# Patient Record
Sex: MALE | Race: BLACK OR AFRICAN AMERICAN | NOT HISPANIC OR LATINO | Employment: UNEMPLOYED | URBAN - METROPOLITAN AREA
[De-identification: names, ages, dates, MRNs, and addresses within clinical notes are randomized per-mention and may not be internally consistent; named-entity substitution may affect disease eponyms.]

---

## 2023-05-18 LAB
ANION GAP IN SER/PLAS: 12 MMOL/L (ref 10–20)
BASOPHILS (10*3/UL) IN BLOOD BY AUTOMATED COUNT: 0.07 X10E9/L (ref 0–0.1)
BASOPHILS/100 LEUKOCYTES IN BLOOD BY AUTOMATED COUNT: 1.2 % (ref 0–2)
CALCIUM (MG/DL) IN SER/PLAS: 9.5 MG/DL (ref 8.6–10.3)
CARBON DIOXIDE, TOTAL (MMOL/L) IN SER/PLAS: 25 MMOL/L (ref 21–32)
CHLORIDE (MMOL/L) IN SER/PLAS: 100 MMOL/L (ref 98–107)
CREATININE (MG/DL) IN SER/PLAS: 0.84 MG/DL (ref 0.5–1.3)
EOSINOPHILS (10*3/UL) IN BLOOD BY AUTOMATED COUNT: 0.17 X10E9/L (ref 0–0.7)
EOSINOPHILS/100 LEUKOCYTES IN BLOOD BY AUTOMATED COUNT: 3 % (ref 0–6)
ERYTHROCYTE DISTRIBUTION WIDTH (RATIO) BY AUTOMATED COUNT: 13.6 % (ref 11.5–14.5)
ERYTHROCYTE MEAN CORPUSCULAR HEMOGLOBIN CONCENTRATION (G/DL) BY AUTOMATED: 35.7 G/DL (ref 32–36)
ERYTHROCYTE MEAN CORPUSCULAR VOLUME (FL) BY AUTOMATED COUNT: 90 FL (ref 80–100)
ERYTHROCYTES (10*6/UL) IN BLOOD BY AUTOMATED COUNT: 5.01 X10E12/L (ref 4.5–5.9)
GFR MALE: >90 ML/MIN/1.73M2
GLUCOSE (MG/DL) IN SER/PLAS: 100 MG/DL (ref 74–99)
HEMATOCRIT (%) IN BLOOD BY AUTOMATED COUNT: 45.1 % (ref 41–52)
HEMOGLOBIN (G/DL) IN BLOOD: 16.1 G/DL (ref 13.5–17.5)
IMMATURE GRANULOCYTES/100 LEUKOCYTES IN BLOOD BY AUTOMATED COUNT: 0.2 % (ref 0–0.9)
LEUKOCYTES (10*3/UL) IN BLOOD BY AUTOMATED COUNT: 5.7 X10E9/L (ref 4.4–11.3)
LYMPHOCYTES (10*3/UL) IN BLOOD BY AUTOMATED COUNT: 1.45 X10E9/L (ref 1.2–4.8)
LYMPHOCYTES/100 LEUKOCYTES IN BLOOD BY AUTOMATED COUNT: 25.4 % (ref 13–44)
MONOCYTES (10*3/UL) IN BLOOD BY AUTOMATED COUNT: 0.54 X10E9/L (ref 0.1–1)
MONOCYTES/100 LEUKOCYTES IN BLOOD BY AUTOMATED COUNT: 9.5 % (ref 2–10)
NEUTROPHILS (10*3/UL) IN BLOOD BY AUTOMATED COUNT: 3.46 X10E9/L (ref 1.2–7.7)
NEUTROPHILS/100 LEUKOCYTES IN BLOOD BY AUTOMATED COUNT: 60.7 % (ref 40–80)
PLATELETS (10*3/UL) IN BLOOD AUTOMATED COUNT: 225 X10E9/L (ref 150–450)
POTASSIUM (MMOL/L) IN SER/PLAS: 4.3 MMOL/L (ref 3.5–5.3)
SODIUM (MMOL/L) IN SER/PLAS: 133 MMOL/L (ref 136–145)
UREA NITROGEN (MG/DL) IN SER/PLAS: 11 MG/DL (ref 6–23)

## 2023-05-20 LAB — STAPH/MRSA SCREEN, CULTURE: NORMAL

## 2023-06-01 ENCOUNTER — HOSPITAL ENCOUNTER (OUTPATIENT)
Dept: DATA CONVERSION | Facility: HOSPITAL | Age: 63
End: 2023-06-01
Attending: SURGERY | Admitting: SURGERY

## 2023-06-01 DIAGNOSIS — I10 ESSENTIAL (PRIMARY) HYPERTENSION: ICD-10-CM

## 2023-06-01 DIAGNOSIS — G47.33 OBSTRUCTIVE SLEEP APNEA (ADULT) (PEDIATRIC): ICD-10-CM

## 2023-06-01 DIAGNOSIS — F17.200 NICOTINE DEPENDENCE, UNSPECIFIED, UNCOMPLICATED: ICD-10-CM

## 2023-06-01 DIAGNOSIS — K40.90 UNILATERAL INGUINAL HERNIA, WITHOUT OBSTRUCTION OR GANGRENE, NOT SPECIFIED AS RECURRENT: ICD-10-CM

## 2023-09-07 VITALS — BODY MASS INDEX: 27.11 KG/M2 | HEIGHT: 70 IN | WEIGHT: 189.38 LBS

## 2023-09-30 NOTE — H&P
History & Physical Reviewed:   I have reviewed the History and Physical dated:  18-May-2023   History and Physical reviewed and relevant findings noted. Patient examined to review pertinent physical  findings.: No significant changes   Home Medications Reviewed: no changes noted   Allergies Reviewed: no changes noted     Consent:   COVID-19 Consent:  ·  COVID-19 Risk Consent Surgeon has reviewed key risks related to the risk of brady COVID-19 and if they contract COVID-19 what the risks are.       Electronic Signatures:  Bang Arnold)  (Signed 01-Jun-2023 10:00)   Authored: History & Physical Reviewed, Consent, Note  Completion      Last Updated: 01-Jun-2023 10:00 by Bagn Arnold)

## 2023-10-02 NOTE — OP NOTE
PROCEDURE DETAILS    Preoperative Diagnosis:  Right inguinal hernia     Postoperative Diagnosis:  Right inguinal hernia     Surgeon: Bang Arnold  Resident/Fellow/Other Assistant: Jigar Treviño    Procedure:  1. Robotic Right Inguinal Hernia Repair; Mesh Placement    Anesthesia: General  Jr Rayo  Estimated Blood Loss: 5  Findings: moderate to large indirect right inguinal hernia     Specimens(s) Collected: no,     Complications: none  Patient Returned To/Condition: Stable to PACU         Operative Report:   CLINICAL NOTE:    The patient is a 63-year-old man comes in for elective repair of a symptomatic right inguinal hernia.   Risk benefits alternatives were discussed preoperatively and he agrees to the operation.      DESCRIPTION OF PROCEDURE:    The patient was taken to the operating room, placed supine on the operating table.  Time-out was established.  General anesthesia was induced.  He was given antibiotics.  A Rader catheter was placed that was later removed after the procedure.  The abdomen  was prepped and draped in a sterile fashion.  We made a supraumbilical midline incision, placed a 8 mm Shanique trocar.  We established insufflation. We placed 8 mm ports in the left and right upper abdominal wall respectively.  The patient had been placed  in the Trendelenburg position.  We then docked the robot to our ports.   I scrubbed out and performed the operation from a console in the corner of the room.      He was noted to have a moderate to large indirect right inguinal hernia.   We scored the peritoneum just below the right inguinal ligament and very carefully took the peritoneum down from the right lower abdominal wall.  With very careful dissection we  reduced the hernia sac and isolated the cord structures.  There was no obvious indirect component.  The entire myopectineal orifice was exposed.  We then introduced a  large right-sided 3DMax mesh through our camera port, positioned this  over the hernia  defect, and secured it into place using interrupted  3-0 Vicryl stitches.  Sutures were placed anteriorly, laterally and at coopers ligament.  Next we re-peritonealized our repair by sewing the peritoneum up using 3-0 absorbable V-Loc suture.     We then removed our ports and then closed our midline fascial defect using 0 Vicryl suture placed in a figure of eight fashion.  Skin was reapproximated using 3-0 and 4-0 subcuticular Vicryl stitches followed by Dermabond glue.  The patient tolerated  the procedure without difficulty and was returned to the recovery room in stable condition.    Bang Arnold MD   Note Recipients:   Bang Arnold MD Thomas, Paul, DO - [phone] []                        Attestation:   Note Completion:  Attending Attestation I performed the procedure without a resident         Electronic Signatures:  Bang Arnold)  (Signed 01-Jun-2023 12:27)   Authored: Post-Operative Note, Chart Review, Note Completion      Last Updated: 01-Jun-2023 12:27 by Bang Arnold)

## 2024-04-15 ENCOUNTER — OFFICE VISIT (OUTPATIENT)
Dept: SURGERY | Facility: CLINIC | Age: 64
End: 2024-04-15
Payer: COMMERCIAL

## 2024-04-15 VITALS
SYSTOLIC BLOOD PRESSURE: 113 MMHG | TEMPERATURE: 96 F | HEART RATE: 80 BPM | OXYGEN SATURATION: 98 % | DIASTOLIC BLOOD PRESSURE: 72 MMHG

## 2024-04-15 DIAGNOSIS — R10.30 LOWER ABDOMINAL PAIN: Primary | ICD-10-CM

## 2024-04-15 PROCEDURE — 3008F BODY MASS INDEX DOCD: CPT | Performed by: SURGERY

## 2024-04-15 PROCEDURE — 99213 OFFICE O/P EST LOW 20 MIN: CPT | Performed by: SURGERY

## 2024-04-15 ASSESSMENT — ENCOUNTER SYMPTOMS: DEPRESSION: 0

## 2024-04-15 ASSESSMENT — PAIN SCALES - GENERAL: PAINLEVEL: 10-WORST PAIN EVER

## 2024-04-15 NOTE — LETTER
"April 15, 2024     Graham Sheikh DO  [id number]  [address]  [place] [address]    Patient: Tom Morales   YOB: 1960   Date of Visit: 4/15/2024       Dear Dr. Graham Sheikh DO:    Thank you for referring Tom Morales to me for evaluation. Below are my notes for this consultation.  If you have questions, please do not hesitate to call me. I look forward to following your patient along with you.       Sincerely,     Bang Arnold MD      CC: No Recipients  ______________________________________________________________________________________    History of Present IllnessPatient presents for initial post-op follow-up from robotic repair of a large right inguinal hernia performed on 6/01/23.      At initial postop visit was noted to have a moderate-sized seroma. Also complained of pain in the groin with radiation to right testicle.. Also had exquisite hypersensitivity to right scrotum.     Comes back for follow-up.  The above symptoms has resolved.    He comes in with a new complaint of some crampy periumbilical and bilateral lower abdominal pain.  This occurs fairly suddenly after exertion especially worse with sex.  Pain is compared to that of a \"charley horse \".  I mention being dehydrated and he reports that in the recent past she has been under severe stress and admits to drinking at least a sixpack per day but he stopped this roughly 10 days ago.  He feels that he may have been dehydrated which could possibly contribute to his symptoms.    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)   · Non-restorative sleep (780.59) (G47.8)   · Pain of " left hand (729.5) (M79.642)   · Post-traumatic osteoarthritis of right knee (715.26) (M17.31)   · Snoring (786.09) (R06.83)   · Suspected sleep apnea (781.99) (R29.818)   · Wound, open, nose (873.20) (S01.20XA)   · Xerostomia (527.7) (K11.7)     Past Medical History     · History of hypertension (V12.59) (Z86.79)     Surgical History     · History of Hernia repair   · History of Knee surgery   · History of Neck Surgery   · History of Nose Surgery   · History of Tonsillectomy with adenoidectom      EXAM:   Well-nourished, well-developed. In no distress  Alert and oriented x 3  Lungs are clear to auscultation bilaterally.  Cardiac exam is regular rhythm and rate.  Abdomen is soft nontender nondistended.  Well-healed periumbilical incision.  No obvious hernias noted  Neurologic exam is without focal deficit.   No recurrent inguinal hernias,    Impression:  Crampy abdominal pain lower abdomen.  No obvious recurrence of his inguinal hernia    Will send for CT scan to further evaluate his stone abdominal wall symptoms are suggestive of however of a musculoskeletal cause

## 2024-04-15 NOTE — PATIENT INSTRUCTIONS
For the pain and there lower abdomen I recommend a CT scan.    Please call the number listed to schedule the study.    After you have gotten the scan give my office a call so that we can review the results

## 2024-04-15 NOTE — PROGRESS NOTES
"History of Present IllnessPatient presents for initial post-op follow-up from robotic repair of a large right inguinal hernia performed on 6/01/23.      At initial postop visit was noted to have a moderate-sized seroma. Also complained of pain in the groin with radiation to right testicle.. Also had exquisite hypersensitivity to right scrotum.     Comes back for follow-up.  The above symptoms has resolved.    He comes in with a new complaint of some crampy periumbilical and bilateral lower abdominal pain.  This occurs fairly suddenly after exertion especially worse with sex.  Pain is compared to that of a \"charley horse \".  I mention being dehydrated and he reports that in the recent past she has been under severe stress and admits to drinking at least a sixpack per day but he stopped this roughly 10 days ago.  He feels that he may have been dehydrated which could possibly contribute to his symptoms.    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 In no distress  Alert and " oriented x 3  Lungs are clear to auscultation bilaterally.  Cardiac exam is regular rhythm and rate.  Abdomen is soft nontender nondistended.  Well-healed periumbilical incision.  No obvious hernias noted  Neurologic exam is without focal deficit.   No recurrent inguinal hernias,    Impression:  Crampy abdominal pain lower abdomen.  No obvious recurrence of his inguinal hernia    Will send for CT scan to further evaluate his stone abdominal wall symptoms are suggestive of however of a musculoskeletal cause

## 2024-05-01 ENCOUNTER — HOSPITAL ENCOUNTER (OUTPATIENT)
Dept: RADIOLOGY | Facility: HOSPITAL | Age: 64
Discharge: HOME | End: 2024-05-01
Payer: MEDICARE

## 2024-05-01 DIAGNOSIS — R10.30 LOWER ABDOMINAL PAIN: ICD-10-CM

## 2024-05-01 PROCEDURE — 74176 CT ABD & PELVIS W/O CONTRAST: CPT

## 2024-05-01 PROCEDURE — 74176 CT ABD & PELVIS W/O CONTRAST: CPT | Performed by: RADIOLOGY

## 2024-05-09 ENCOUNTER — PREP FOR PROCEDURE (OUTPATIENT)
Dept: SURGERY | Facility: HOSPITAL | Age: 64
End: 2024-05-09
Payer: COMMERCIAL

## 2024-05-09 ENCOUNTER — TELEPHONE (OUTPATIENT)
Dept: SURGERY | Facility: CLINIC | Age: 64
End: 2024-05-09
Payer: COMMERCIAL

## 2024-05-09 DIAGNOSIS — K42.9 UMBILICAL HERNIA WITHOUT OBSTRUCTION AND WITHOUT GANGRENE: Primary | ICD-10-CM

## 2024-05-09 NOTE — TELEPHONE ENCOUNTER
Called patient to confirm surgery date. Informed patient that his surgery date is June 25, 2024 with Dr. Bang Arnold at [facility]. Patient agreed to date. Instructions give verbal and instruction sheet will be mailed out today. Patient understood.

## 2024-06-04 ENCOUNTER — CLINICAL SUPPORT (OUTPATIENT)
Dept: PREADMISSION TESTING | Facility: HOSPITAL | Age: 64
End: 2024-06-04
Payer: MEDICARE

## 2024-06-04 DIAGNOSIS — K42.9 UMBILICAL HERNIA WITHOUT OBSTRUCTION AND WITHOUT GANGRENE: ICD-10-CM

## 2024-06-04 RX ORDER — VITAMIN B COMPLEX
1 CAPSULE ORAL DAILY
COMMUNITY

## 2024-06-04 RX ORDER — ACETAMINOPHEN 500 MG
5000 TABLET ORAL DAILY
COMMUNITY

## 2024-06-04 RX ORDER — GINKGO BILOBA LEAF EXTRACT 60 MG
2 CAPSULE ORAL DAILY
COMMUNITY

## 2024-06-04 RX ORDER — GREEN TEA LEAF EXTRACT 500 MG
1 CAPSULE ORAL DAILY
COMMUNITY

## 2024-06-04 RX ORDER — LOSARTAN POTASSIUM AND HYDROCHLOROTHIAZIDE 12.5; 5 MG/1; MG/1
1 TABLET ORAL DAILY
COMMUNITY

## 2024-06-11 ENCOUNTER — PRE-ADMISSION TESTING (OUTPATIENT)
Dept: PREADMISSION TESTING | Facility: HOSPITAL | Age: 64
End: 2024-06-11
Payer: MEDICARE

## 2024-06-11 ENCOUNTER — LAB (OUTPATIENT)
Dept: LAB | Facility: LAB | Age: 64
End: 2024-06-11
Payer: MEDICARE

## 2024-06-11 VITALS
DIASTOLIC BLOOD PRESSURE: 80 MMHG | WEIGHT: 180.78 LBS | OXYGEN SATURATION: 98 % | TEMPERATURE: 98 F | HEART RATE: 70 BPM | HEIGHT: 70 IN | BODY MASS INDEX: 25.88 KG/M2 | RESPIRATION RATE: 16 BRPM | SYSTOLIC BLOOD PRESSURE: 145 MMHG

## 2024-06-11 DIAGNOSIS — Z01.818 PRE-OP TESTING: ICD-10-CM

## 2024-06-11 DIAGNOSIS — Z01.818 PRE-OP TESTING: Primary | ICD-10-CM

## 2024-06-11 LAB
ALBUMIN SERPL BCP-MCNC: 4.9 G/DL (ref 3.4–5)
ALP SERPL-CCNC: 60 U/L (ref 33–136)
ALT SERPL W P-5'-P-CCNC: 14 U/L (ref 10–52)
ANION GAP SERPL CALC-SCNC: 13 MMOL/L (ref 10–20)
AST SERPL W P-5'-P-CCNC: 21 U/L (ref 9–39)
BASOPHILS # BLD AUTO: 0.08 X10*3/UL (ref 0–0.1)
BASOPHILS NFR BLD AUTO: 1.4 %
BILIRUB SERPL-MCNC: 0.6 MG/DL (ref 0–1.2)
BUN SERPL-MCNC: 6 MG/DL (ref 6–23)
CALCIUM SERPL-MCNC: 10 MG/DL (ref 8.6–10.3)
CHLORIDE SERPL-SCNC: 106 MMOL/L (ref 98–107)
CO2 SERPL-SCNC: 24 MMOL/L (ref 21–32)
CREAT SERPL-MCNC: 0.84 MG/DL (ref 0.5–1.3)
EGFRCR SERPLBLD CKD-EPI 2021: >90 ML/MIN/1.73M*2
EOSINOPHIL # BLD AUTO: 0.19 X10*3/UL (ref 0–0.7)
EOSINOPHIL NFR BLD AUTO: 3.3 %
ERYTHROCYTE [DISTWIDTH] IN BLOOD BY AUTOMATED COUNT: 16 % (ref 11.5–14.5)
GLUCOSE SERPL-MCNC: 86 MG/DL (ref 74–99)
HCT VFR BLD AUTO: 48.6 % (ref 41–52)
HGB BLD-MCNC: 17 G/DL (ref 13.5–17.5)
IMM GRANULOCYTES # BLD AUTO: 0.01 X10*3/UL (ref 0–0.7)
IMM GRANULOCYTES NFR BLD AUTO: 0.2 % (ref 0–0.9)
LYMPHOCYTES # BLD AUTO: 1.9 X10*3/UL (ref 1.2–4.8)
LYMPHOCYTES NFR BLD AUTO: 33.4 %
MCH RBC QN AUTO: 32.8 PG (ref 26–34)
MCHC RBC AUTO-ENTMCNC: 35 G/DL (ref 32–36)
MCV RBC AUTO: 94 FL (ref 80–100)
MONOCYTES # BLD AUTO: 0.62 X10*3/UL (ref 0.1–1)
MONOCYTES NFR BLD AUTO: 10.9 %
NEUTROPHILS # BLD AUTO: 2.89 X10*3/UL (ref 1.2–7.7)
NEUTROPHILS NFR BLD AUTO: 50.8 %
NRBC BLD-RTO: 0 /100 WBCS (ref 0–0)
PLATELET # BLD AUTO: 238 X10*3/UL (ref 150–450)
POTASSIUM SERPL-SCNC: 3.9 MMOL/L (ref 3.5–5.3)
PROT SERPL-MCNC: 7.9 G/DL (ref 6.4–8.2)
RBC # BLD AUTO: 5.18 X10*6/UL (ref 4.5–5.9)
SODIUM SERPL-SCNC: 139 MMOL/L (ref 136–145)
WBC # BLD AUTO: 5.7 X10*3/UL (ref 4.4–11.3)

## 2024-06-11 PROCEDURE — 93005 ELECTROCARDIOGRAM TRACING: CPT | Performed by: NURSE PRACTITIONER

## 2024-06-11 PROCEDURE — 85025 COMPLETE CBC W/AUTO DIFF WBC: CPT

## 2024-06-11 PROCEDURE — 80053 COMPREHEN METABOLIC PANEL: CPT

## 2024-06-11 PROCEDURE — 36415 COLL VENOUS BLD VENIPUNCTURE: CPT

## 2024-06-11 PROCEDURE — 87081 CULTURE SCREEN ONLY: CPT | Mod: AHULAB | Performed by: SURGERY

## 2024-06-11 RX ORDER — TRETINOIN 0.25 MG/G
CREAM TOPICAL NIGHTLY
COMMUNITY

## 2024-06-11 RX ORDER — CALCIPOTRIENE 0.005% / CLOBETASOL PROPIONATE 0.05% .005; .05 G/100G; G/100G
SOLUTION TOPICAL
COMMUNITY

## 2024-06-11 RX ORDER — ASPIRIN 81 MG/1
81 TABLET ORAL DAILY
COMMUNITY

## 2024-06-11 RX ORDER — VITAMIN E (DL,TOCOPHERYL ACET) 180 MG
CAPSULE ORAL
COMMUNITY

## 2024-06-11 RX ORDER — CHLORHEXIDINE GLUCONATE ORAL RINSE 1.2 MG/ML
SOLUTION DENTAL
Qty: 475 ML | Refills: 0 | Status: SHIPPED | OUTPATIENT
Start: 2024-06-11

## 2024-06-11 ASSESSMENT — ENCOUNTER SYMPTOMS
DIARRHEA: 0
ABDOMINAL PAIN: 0
EYES NEGATIVE: 1
CONSTIPATION: 0
CARDIOVASCULAR NEGATIVE: 1
BRUISES/BLEEDS EASILY: 0
NEUROLOGICAL NEGATIVE: 1
CONSTITUTIONAL NEGATIVE: 1
ENDOCRINE NEGATIVE: 1
ARTHRALGIAS: 1

## 2024-06-11 NOTE — CPM/PAT H&P
"[facility]/PAT Evaluation       Name: Tom Morales (Tom Morales)  /Age: 1960/64 y.o.         Date of Consult: 24     Referring Provider: Dr. Arnold    Surgery, Date, and Length: Open Umbilical Hernia Repair; Mesh Placement, 24, 90 min.    Tom Morales is a 64 year-old male who presents to the [facility] for perioperative risk assessment prior to surgery.    Patient presents with a primary diagnosis of an umbilical hernia without obstruction and without gangrene.    He comes in with a new complaint of some crampy periumbilical and bilateral lower abdominal pain. This occurs fairly suddenly after exertion especially worse with sex. Pain is compared to that of a \"charley horse \".   He also notes that when he exerts himself, his hernia \"pops out\". Of note he drinks a 6 pack of beer daily.   This note was created in part upon personal review of patient's medical records.      Patient is scheduled to have an Open Umbilical Hernia Repair; Mesh Placement.    Pt denies any past history of anesthetic complications such as PONV, awareness, prolonged sedation, dental damage, aspiration, cardiac arrest, difficult intubation, difficult I.V. access or unexpected hospital admissions.  NO malignant hyperthermia and or pseudocholinesterase deficiency.  No history of blood transfusions     The patient is not a Islam and will accept blood and blood products if medically indicated.   Type and screen sent.     Past Medical History:   Diagnosis Date    Hypertension        Past Surgical History:   Procedure Laterality Date    ADENOIDECTOMY      as a child    HERNIA REPAIR          JOINT REPLACEMENT      Right knee    NECK SURGERY  2018    Neck Surgery    NOSE SURGERY  2018    Nose Surgery    TONSILECTOMY, ADENOIDECTOMY, BILATERAL MYRINGOTOMY AND TUBES      as a child     Social History     Socioeconomic History    Marital status:      Spouse name: Not on file    Number of children: " Not on file    Years of education: Not on file    Highest education level: Not on file   Occupational History    Not on file   Tobacco Use    Smoking status: Every Day     Types: Cigarettes, Cigars    Smokeless tobacco: Never    Tobacco comments:     Black and mild, smokes 4 daily   Vaping Use    Vaping status: Never Used   Substance and Sexual Activity    Alcohol use: Yes     Alcohol/week: 42.0 standard drinks of alcohol     Types: 42 Cans of beer per week    Drug use: Yes     Types: Marijuana     Comment: smokes daily    Sexual activity: Defer   Other Topics Concern    Not on file   Social History Narrative    Not on file     Social Determinants of Health     Financial Resource Strain: Not on file   Food Insecurity: Not on file   Transportation Needs: Not on file   Physical Activity: Not on file   Stress: Not on file   Social Connections: Not on file   Intimate Partner Violence: Not on file   Housing Stability: Not on file        Family History   Problem Relation Name Age of Onset    Other (htn) Mother      Sleep apnea Mother      No Known Problems Father      No Known Problems Sister      Other (htn) Brother         Allergies   Allergen Reactions    Ace Inhibitors Anaphylaxis    Amoxicillin Unknown     Patient thinks reaction happened at same time as Ace inhibitors         Current Outpatient Medications:     aspirin 81 mg EC tablet, Take 1 tablet (81 mg) by mouth once daily., Disp: , Rfl:     b complex vitamins capsule, Take 1 capsule by mouth once daily., Disp: , Rfl:     CAPSICUM, CAYENNE, ORAL, Take 1 capsule by mouth once daily., Disp: , Rfl:     cholecalciferol (Vitamin D3) 5,000 Units tablet, Take 1 tablet (5,000 Units) by mouth once daily., Disp: , Rfl:     clobetasoL-calcipotriene 0.05-0.005 % solution, Apply topically., Disp: , Rfl:     cranberry 400 mg capsule, Take 3 capsules by mouth once daily., Disp: , Rfl:     ginseng 100 mg capsule, Take 2 capsules by mouth once daily., Disp: , Rfl:     hyalur  ac/chond sul/colg II/AA (HYALURONIC ACID, CHOND-COLLGN, ORAL), Take 1 capsule by mouth once daily., Disp: , Rfl:     KRILL OIL ORAL, Take 1 capsule by mouth once daily., Disp: , Rfl:     losartan-hydrochlorothiazide (Hyzaar) 50-12.5 mg tablet, Take 1 tablet by mouth once daily., Disp: , Rfl:     psyllium (Metamucil) powder, Take 1 Dose (5.8 g) by mouth once daily., Disp: , Rfl:     saw/vit E/sod ruby/lyc/beta/pyg (PROSTATE HEALTH ORAL), Take 1 capsule by mouth once daily., Disp: , Rfl:     tretinoin (Retin-A) 0.025 % cream, Apply topically once daily at bedtime., Disp: , Rfl:     turmeric/turmeric ext/pepr ext (turmeric-turmeric ext-pepper) 500-3 mg capsule, Take by mouth., Disp: , Rfl:     chlorhexidine (Peridex) 0.12 % solution, Swish for 30 seconds and spit 15mL of solution the night before and morning of surgery, Disp: 475 mL, Rfl: 0    green tea leaf extract 500 mg capsule, Take 1 capsule by mouth once daily., Disp: , Rfl:     NON FORMULARY, , Disp: , Rfl:       PAT ROS:   Constitutional:   neg    Neuro/Psych:   neg    Eyes:   neg    Ears:    no hearing aides  Nose:   Mouth:   neg    Throat:   neg    Neck:    History of neck surgery, has limitations when turing his neck to the right side. Note he will get vertigo if he turns his neck too much when looking behind him when pulling his car out of the driveway.   Cardio:   neg    Respiratory:    Suspected sleep apnea, does not wear CPAP  Endocrine:   neg    GI:    no abdominal pain   no constipation   no diarrhea  :   neg    Musculoskeletal:    arthralgias (generalized joint pain)  Hematologic:    does not bruise/bleed easily   no history of blood transfusion   no blood clots  Skin:   Scabbed area noted on right nare.      Physical Exam  Physical exam within normal limits.   Neck:      Comments: History of neck surgery, has limitations when turing his neck to the right side. Note he will get vertigo if he turns his neck too much when looking behind him when  "pulling his car out of the driveway.   Abdominal:      General: Bowel sounds are normal. There is no distension.      Palpations: Abdomen is soft.      Tenderness: There is no abdominal tenderness. There is no guarding.      Hernia: A hernia is present.          PAT AIRWAY:   Airway:     Mallampati::  III    Neck ROM::  Limited   No broken teeth, no dentures and no missing teeth        Visit Vitals  /80   Pulse 70   Temp 36.7 °C (98 °F)   Resp 16   Ht 1.775 m (5' 9.88\")   Wt 82 kg (180 lb 12.4 oz)   SpO2 98%   BMI 26.03 kg/m²   Smoking Status Every Day   BSA 2.01 m²      Plan    Neuro:  Monitor for signs and symptoms of substance withdrawal during the postoperative period, assess, and treat as needed with the appropriate monitoring tool.     Cardiovascular:  Patient denies any chest pain, tightness, heaviness, pressure, radiating pain, palpitations, irregular heartbeats, lightheadedness, cough, congestion, shortness of breath, CAR, PND, near syncope, weight loss or gain.    Asa 81 mg -to be taken dos     HTN:   Losartan-hydrochlorothiazide- Patient to hold one day prior to surgery.    EKG in PAT on 06/11/24   NSR@71   Possible left atrial enlargement  Left anterior fasicular block  Septal infarct  Abnormal EKG    RCRI: 1 Risk of Mace: 6.0%    Pulm:  DENIZ-Patient asked to follow up with PCP for suspected DENIZ. Recommend prioritizing  nonopioid analgesic techniques (regional and local anesthesia, nonsteroidal medications, etc) before the administration of opioids and close monitoring for hypoventilation after surgery due to suspected DENIZ. If intravenous narcotics are needed beyond the immediate saumya-operative period, the patient may benefit from continuous pulse oximetry to monitor for hypoxic events till baseline Sp02 is normal on room air and  a respiratory therapy evaluation.     Heme:  Patient instructed to ambulate as soon as possible postoperatively to decrease thromboembolic risk.    Initiate mechanical DVT " prophylaxis as soon as possible and initiate chemical prophylaxis when deemed safe from a bleeding standpoint post surgery.    Caprini=4    Risk assessment complete.  Patient is scheduled for an intermediate surgical risk procedure.      Labs/testing obtained in PAT on 06/11/24   CBC, CMP, MRSA, EKG  Lab Results   Component Value Date    WBC 5.7 06/11/2024    HGB 17.0 06/11/2024    HCT 48.6 06/11/2024    MCV 94 06/11/2024     06/11/2024      Lab Results   Component Value Date    GLUCOSE 86 06/11/2024    CALCIUM 10.0 06/11/2024     06/11/2024    K 3.9 06/11/2024    CO2 24 06/11/2024     06/11/2024    BUN 6 06/11/2024    CREATININE 0.84 06/11/2024      Lab Results   Component Value Date    ALT 14 06/11/2024    AST 21 06/11/2024    ALKPHOS 60 06/11/2024    BILITOT 0.6 06/11/2024      CBC, CMP reviewed, unremarkable.     Follow up: MRSA pending    Preoperative medication instructions were provided and reviewed with the patient.  Any additional testing or evaluation was explained to the patient.  Nothing by mouth instructions were discussed and patient's questions were answered prior to conclusion to this encounter.  Patient verbalized understanding of preoperative instructions given in preadmission testing; discharge instructions available in EMR.    This note was dictated with speech recognition.  Minor errors may have been detected during use of speech recognition.

## 2024-06-11 NOTE — H&P (VIEW-ONLY)
"[facility] [place]       Name: Tom Morales (Tom Morales)  /Age: 1960/64 y.o.         Date of Consult: 24     Referring Provider: Dr. Arnold    Surgery, Date, and Length: Open Umbilical Hernia Repair; Mesh Placement, 24, 90 min.    Tom Morales is a 64 year-old male who presents to the [facility] for perioperative risk assessment prior to surgery.    Patient presents with a primary diagnosis of an umbilical hernia without obstruction and without gangrene.    He comes in with a new complaint of some crampy periumbilical and bilateral lower abdominal pain. This occurs fairly suddenly after exertion especially worse with sex. Pain is compared to that of a \"charley horse \".   He also notes that when he exerts himself, his hernia \"pops out\". Of note he drinks a 6 pack of beer daily.   This note was created in part upon personal review of patient's medical records.      Patient is scheduled to have an Open Umbilical Hernia Repair; Mesh Placement.    Pt denies any past history of anesthetic complications such as PONV, awareness, prolonged sedation, dental damage, aspiration, cardiac arrest, difficult intubation, difficult I.V. access or unexpected hospital admissions.  NO malignant hyperthermia and or pseudocholinesterase deficiency.  No history of blood transfusions     The patient is not a Jewish and will accept blood and blood products if medically indicated.   Type and screen sent.     Past Medical History:   Diagnosis Date    Hypertension        Past Surgical History:   Procedure Laterality Date    ADENOIDECTOMY      as a child    HERNIA REPAIR          JOINT REPLACEMENT      Right knee    NECK SURGERY  2018    Neck Surgery    NOSE SURGERY  2018    Nose Surgery    TONSILECTOMY, ADENOIDECTOMY, BILATERAL MYRINGOTOMY AND TUBES      as a child     Social History     Socioeconomic History    Marital status:      Spouse name: Not on file    Number of children: " Not on file    Years of education: Not on file    Highest education level: Not on file   Occupational History    Not on file   Tobacco Use    Smoking status: Every Day     Types: Cigarettes, Cigars    Smokeless tobacco: Never    Tobacco comments:     Black and mild, smokes 4 daily   Vaping Use    Vaping status: Never Used   Substance and Sexual Activity    Alcohol use: Yes     Alcohol/week: 42.0 standard drinks of alcohol     Types: 42 Cans of beer per week    Drug use: Yes     Types: Marijuana     Comment: smokes daily    Sexual activity: Defer   Other Topics Concern    Not on file   Social History Narrative    Not on file     Social Determinants of Health     Financial Resource Strain: Not on file   Food Insecurity: Not on file   Transportation Needs: Not on file   Physical Activity: Not on file   Stress: Not on file   Social Connections: Not on file   Intimate Partner Violence: Not on file   Housing Stability: Not on file        Family History   Problem Relation Name Age of Onset    Other (htn) Mother      Sleep apnea Mother      No Known Problems Father      No Known Problems Sister      Other (htn) Brother         Allergies   Allergen Reactions    Ace Inhibitors Anaphylaxis    Amoxicillin Unknown     Patient thinks reaction happened at same time as Ace inhibitors         Current Outpatient Medications:     aspirin 81 mg EC tablet, Take 1 tablet (81 mg) by mouth once daily., Disp: , Rfl:     b complex vitamins capsule, Take 1 capsule by mouth once daily., Disp: , Rfl:     CAPSICUM, CAYENNE, ORAL, Take 1 capsule by mouth once daily., Disp: , Rfl:     cholecalciferol (Vitamin D3) 5,000 Units tablet, Take 1 tablet (5,000 Units) by mouth once daily., Disp: , Rfl:     clobetasoL-calcipotriene 0.05-0.005 % solution, Apply topically., Disp: , Rfl:     cranberry 400 mg capsule, Take 3 capsules by mouth once daily., Disp: , Rfl:     ginseng 100 mg capsule, Take 2 capsules by mouth once daily., Disp: , Rfl:     hyalur  ac/chond sul/colg II/AA (HYALURONIC ACID, CHOND-COLLGN, ORAL), Take 1 capsule by mouth once daily., Disp: , Rfl:     KRILL OIL ORAL, Take 1 capsule by mouth once daily., Disp: , Rfl:     losartan-hydrochlorothiazide (Hyzaar) 50-12.5 mg tablet, Take 1 tablet by mouth once daily., Disp: , Rfl:     psyllium (Metamucil) powder, Take 1 Dose (5.8 g) by mouth once daily., Disp: , Rfl:     saw/vit E/sod ruby/lyc/beta/pyg (PROSTATE HEALTH ORAL), Take 1 capsule by mouth once daily., Disp: , Rfl:     tretinoin (Retin-A) 0.025 % cream, Apply topically once daily at bedtime., Disp: , Rfl:     turmeric/turmeric ext/pepr ext (turmeric-turmeric ext-pepper) 500-3 mg capsule, Take by mouth., Disp: , Rfl:     chlorhexidine (Peridex) 0.12 % solution, Swish for 30 seconds and spit 15mL of solution the night before and morning of surgery, Disp: 475 mL, Rfl: 0    green tea leaf extract 500 mg capsule, Take 1 capsule by mouth once daily., Disp: , Rfl:     NON FORMULARY, , Disp: , Rfl:       PAT ROS:   Constitutional:   neg    Neuro/Psych:   neg    Eyes:   neg    Ears:    no hearing aides  Nose:   Mouth:   neg    Throat:   neg    Neck:    History of neck surgery, has limitations when turing his neck to the right side. Note he will get vertigo if he turns his neck too much when looking behind him when pulling his car out of the driveway.   Cardio:   neg    Respiratory:    Suspected sleep apnea, does not wear CPAP  Endocrine:   neg    GI:    no abdominal pain   no constipation   no diarrhea  :   neg    Musculoskeletal:    arthralgias (generalized joint pain)  Hematologic:    does not bruise/bleed easily   no history of blood transfusion   no blood clots  Skin:   Scabbed area noted on right nare.      Physical Exam  Physical exam within normal limits.   Neck:      Comments: History of neck surgery, has limitations when turing his neck to the right side. Note he will get vertigo if he turns his neck too much when looking behind him when  "pulling his car out of the driveway.   Abdominal:      General: Bowel sounds are normal. There is no distension.      Palpations: Abdomen is soft.      Tenderness: There is no abdominal tenderness. There is no guarding.      Hernia: A hernia is present.          PAT AIRWAY:   Airway:     Mallampati::  III    Neck ROM::  Limited   No broken teeth, no dentures and no missing teeth        Visit Vitals  /80   Pulse 70   Temp 36.7 °C (98 °F)   Resp 16   Ht 1.775 m (5' 9.88\")   Wt 82 kg (180 lb 12.4 oz)   SpO2 98%   BMI 26.03 kg/m²   Smoking Status Every Day   BSA 2.01 m²      Plan    Neuro:  Monitor for signs and symptoms of substance withdrawal during the postoperative period, assess, and treat as needed with the appropriate monitoring tool.     Cardiovascular:  Patient denies any chest pain, tightness, heaviness, pressure, radiating pain, palpitations, irregular heartbeats, lightheadedness, cough, congestion, shortness of breath, CAR, PND, near syncope, weight loss or gain.    Asa 81 mg -to be taken dos     HTN:   Losartan-hydrochlorothiazide- Patient to hold one day prior to surgery.    EKG in PAT on 06/11/24   NSR@71   Possible left atrial enlargement  Left anterior fasicular block  Septal infarct  Abnormal EKG    RCRI: 1 Risk of Mace: 6.0%    Pulm:  DENIZ-Patient asked to follow up with PCP for suspected DENIZ. Recommend prioritizing  nonopioid analgesic techniques (regional and local anesthesia, nonsteroidal medications, etc) before the administration of opioids and close monitoring for hypoventilation after surgery due to suspected DENIZ. If intravenous narcotics are needed beyond the immediate saumya-operative period, the patient may benefit from continuous pulse oximetry to monitor for hypoxic events till baseline Sp02 is normal on room air and  a respiratory therapy evaluation.     Heme:  Patient instructed to ambulate as soon as possible postoperatively to decrease thromboembolic risk.    Initiate mechanical DVT " prophylaxis as soon as possible and initiate chemical prophylaxis when deemed safe from a bleeding standpoint post surgery.    Caprini=4    Risk assessment complete.  Patient is scheduled for an intermediate surgical risk procedure.      Labs/testing obtained in PAT on 06/11/24   CBC, CMP, MRSA, EKG  Lab Results   Component Value Date    WBC 5.7 06/11/2024    HGB 17.0 06/11/2024    HCT 48.6 06/11/2024    MCV 94 06/11/2024     06/11/2024      Lab Results   Component Value Date    GLUCOSE 86 06/11/2024    CALCIUM 10.0 06/11/2024     06/11/2024    K 3.9 06/11/2024    CO2 24 06/11/2024     06/11/2024    BUN 6 06/11/2024    CREATININE 0.84 06/11/2024      Lab Results   Component Value Date    ALT 14 06/11/2024    AST 21 06/11/2024    ALKPHOS 60 06/11/2024    BILITOT 0.6 06/11/2024      CBC, CMP reviewed, unremarkable.     Follow up: MRSA pending    Preoperative medication instructions were provided and reviewed with the patient.  Any additional testing or evaluation was explained to the patient.  Nothing by mouth instructions were discussed and patient's questions were answered prior to conclusion to this encounter.  Patient verbalized understanding of preoperative instructions given in preadmission testing; discharge instructions available in EMR.    This note was dictated with speech recognition.  Minor errors may have been detected during use of speech recognition.

## 2024-06-11 NOTE — PREPROCEDURE INSTRUCTIONS
Medication List            Accurate as of June 11, 2024  9:44 AM. Always use your most recent med list.                b complex vitamins capsule  Medication Adjustments for Surgery: Stop 7 days before surgery     CAPSICUM (CAYENNE) ORAL  Medication Adjustments for Surgery: Stop 7 days before surgery     chlorhexidine 0.12 % solution  Commonly known as: Peridex  Swish for 30 seconds and spit 15mL of solution the night before and morning of surgery     clobetasoL-calcipotriene 0.05-0.005 % solution  Medication Adjustments for Surgery: Continue until night before surgery     cranberry 400 mg capsule  Medication Adjustments for Surgery: Stop 7 days before surgery     ginseng 100 mg capsule  Medication Adjustments for Surgery: Stop 7 days before surgery     green tea leaf extract 500 mg capsule  Medication Adjustments for Surgery: Stop 7 days before surgery     HYALURONIC ACID (CHOND-COLLGN) ORAL  Medication Adjustments for Surgery: Stop 7 days before surgery     KRILL OIL ORAL  Medication Adjustments for Surgery: Stop 7 days before surgery     losartan-hydrochlorothiazide 50-12.5 mg tablet  Commonly known as: Hyzaar  Medication Adjustments for Surgery: Stop 1 day before surgery     PROSTATE HEALTH ORAL  Medication Adjustments for Surgery: Stop 7 days before surgery     psyllium powder  Commonly known as: Metamucil  Medication Adjustments for Surgery: Stop 1 day before surgery     tretinoin 0.025 % cream  Commonly known as: Retin-A  Medication Adjustments for Surgery: Continue until night before surgery     turmeric-turmeric ext-pepper 500-3 mg capsule  Medication Adjustments for Surgery: Stop 7 days before surgery     Vitamin D3 5,000 Units tablet  Generic drug: cholecalciferol  Medication Adjustments for Surgery: Stop 1 day before surgery                        **Concerning above medication instructions, if medication is normally taken at night, continue normal schedule.**  **DO NOT TAKE NIGHT PRIOR AND MORNING OF  SURGERY**    CONTACT SURGEON'S OFFICE IF YOU DEVELOP:  * Fever = 100.4 F   * New respiratory symptoms (e.g. cough, shortness of breath, respiratory distress, sore throat)  * Recent loss of taste or smell  *Flu like symptoms such as headache, fatigue or gastrointestinal symptoms  * You develop any open sores, shingles, burning or painful urination   AND/OR:  * You no longer wish to have the surgery.  * Any other personal circumstances change that may lead to the need to cancel or defer this surgery.  *You were admitted to any hospital within one week of your planned procedure.    SMOKING:  *Quitting smoking can make a huge difference to your health and recovery from surgery.    *If you need help with quitting, call 3-594-QUIT-NOW.    THE DAY BEFORE SURGERY:  *Do not eat any food after midnight the night before surgery.   *You are permitted to drink clear liquids (i.e. water, black coffee (no milk or cream), tea, apple juice and electrolyte drinks (gatorade)) up to 13.5 ounces, up to 2 hours before your arrival time.  *You may chew gum until 2 hours before your surgery    SURGICAL TIME  *You will be contacted between 2 p.m. and 6 p.m. the business day before your surgery with your arrival time.  *If you haven't received a call by 6pm, call [phone].  *Scheduled surgery times may change and you will be notified if this occurs-check your personal voicemail for any updates.    ON THE MORNING OF SURGERY:  *Wear comfortable, loose fitting clothing.   *Do not use moisturizers, creams, lotions or perfume.  *All jewelry and valuables should be left at home.  *Prosthetic devices such as contact lenses, hearing aids, dentures, eyelash extensions, hairpins and body piercing must be removed before surgery.    BRING WITH YOU:  *Photo ID and insurance card  *Current list of medicines and allergies  *Pacemaker/Defibrillator/Heart stent cards  *CPAP machine and mask  *Slings/splints/crutches  *Copy of your complete Advanced  Directive/DHPOA-if applicable  *Neurostimulator implant remote    PARKING AND ARRIVAL:  *Check in at the Main Entrance desk and let them know you are here for surgery.  *You will be directed to the 2nd floor surgical waiting area.    AFTER OUTPATIENT SURGERY:  *A responsible adult MUST accompany you at the time of discharge and stay with you for 24 hours after your surgery.  *You may NOT drive yourself home after surgery.  *You may use a taxi or ride sharing service ("[employer]) to return home ONLY if you are accompanied by a friend or family member.  *Instructions for resuming your medications will be provided by your surgeon.         Patient Information: Oral/Dental Rinse  **This is a prescription; pick it up at your preferred local pharmacy **  What is oral/dental rinse?   It is a mouthwash. It is a way of cleaning the mouth with a germ killing solution before your surgery.  The solution contains chlorhexidine, commonly known as CHG.   It is used inside the mouth to kill a bacteria known as Staphylococcus aureus.  Let your doctor know if you are allergic to Chlorhexidine.    Why do I need to use CHG oral/dental rinse?  The CHG oral/dental rinse helps to kill a bacteria in your mouth known a Staphylococcus aureus.     This reduces the risk of infection at the surgical site.      Using your CHG oral/dental rinse  STEPS:  Use your CHG oral/dental rinse after you brush your teeth the night before (at bedtime) and the morning of your surgery.  Follow all directions on your prescription label.    Use the cap on the container to measure 15ml (fill cap to fill line)  Swish (gargle if you can) the mouthwash in your mouth for at least 30 seconds, (do not to swallow) spit out  After you use your CHG rinse, do not rinse your mouth with water, drink or eat.  Please refer to prescription label for the appropriate time to resume oral intake  Dental rinse comes in one size bottle: 473ml ~16oz.  You will have leftover    rinse,  discard after this use.    What side effects might I have using the CHG oral/dental rinse?  CHG rinse will stick to plaque on the teeth.  Brush and floss just before use.  Teeth brushing will help avoid staining of plaque during use.    Who should I contact if I have questions about the CHG oral/dental rinse?  Please call [facility], Preadmission Testing at [phone] if you have any questions       Home Preoperative Antibacterial Shower     What is a home preoperative antibacterial shower?  This shower is a way of cleaning the skin with a germ killing soap before surgery.  The soap contains chlorhexidine, commonly known as CHG.  CHG is a soap for your skin with germ killing ability.  Let your doctor know if you are allergic to chlorhexidine.    Why do I need to take a preoperative antibacterial shower?  Skin is not sterile.  It is best to try to make your skin as free of germs as possible before surgery.  Proper cleansing with a germ killing soap before surgery can lower the number of germs on your skin.  This helps to reduce the risk of infection at the surgical site.  Following the instructions listed below will help you prepare your skin for surgery.      How do I use the CHG skin cleanser?  Steps:  Begin using your CHG soap five days before your scheduled surgery on ________________________.    Days 1-4 Shower before bed:  Wash your face and genitals with your normal soap and rinse.    Wash and rinse your hair using the CHG soap. Rinse completely, do not condition your   Hair.          3.    Apply the CHG soap to a clean wet washcloth.  Turn the water off or move away                From the water spray to avoid premature rinsing of the CHG soap as you are applying.     4.   Lather your entire body from the neck down.  Do not use on your face or genitals.   Pay special attention to the area(s) where your incision(s) will be located unless they are on your face.  Avoid scrubbing  your skin too hard.  The important point is to have the CHG soap sit on your skin for 3 minutes.    When the 3 minutes are up, turn on the water and rinse the CHG soap off your body completely.   Pat yourself dry with a clean, freshly-laundered towel.  Dress in clean, freshly laundered night clothes.    Be sure to sleep with clean, freshly laundered sheets.  Day 5:  Last shower is the morning before surgery: Follow above Instructions.    NOTE:    *Hair extensions should be removed    *Keep CHG soap out of eyes and ear canals   *DO NOT wash with regular soap on your body after you have used the CHG        soap solution  *DO NOT apply powders, lotions, or perfume.  *Deodorant may be used days 1-4, BUT NOT the day of surgery    Who should I contact if I have any questions regarding the use of CHG soap?  Call the [facility], Preadmission Testing at [phone] if you have any questions.              Patient Information: Pre-Operative Infection Prevention Measures     Why did I have my nose, under my arms and groin swabbed?  The purpose of the swab is to identify Staphylococcus aureus inside your nose or on your skin.  The swab was sent to the laboratory for culture.  A positive swab/culture for Staphylococcus aureus is called colonization or carriage.      What is Staphylococcus aureus?  Staphylococcus aureus, also known as “staph”, is a germ found on the skin or in the nose of healthy people.  Sometimes Staphylococcus aureus can get into the body and cause an infection.  This can be minor (such as pimples, boils or other skin problems).  It might also be serious (such as blood infection, pneumonia or a surgical site infection).    What is Staphylococcus aureus colonization or carriage?  Colonization or carriage means that a person has the germ but is not sick from it.  These bacteria can be spread on the hands or when breathing or sneezing.    How is Staphylococcus aureus spread?  It is  most often spread by close contact with a person or item that carries it.    What happens if my culture is positive for Staphylococcus aureus?  Your doctor/medical team will use this information to guide any antibiotic treatment which may be necessary.  Regardless of the culture results, we will clean the inside of your nose with a betadine swab just before you have your surgery.      Will I get an infection if I have Staphylococcus aureus in my nose or on my skin?  Anyone can get an infection with Staphylococcus aureus.  However, the best way to reduce your risk of infection is to follow the instructions provided to you for the use of your CHG soap and dental rinse.        Who should I contact if I have any questions?  Call the [facility], Preadmission Testing at [phone] if you have any questions.

## 2024-06-12 ENCOUNTER — APPOINTMENT (OUTPATIENT)
Dept: OPHTHALMOLOGY | Facility: CLINIC | Age: 64
End: 2024-06-12
Payer: COMMERCIAL

## 2024-06-12 DIAGNOSIS — H52.13 MYOPIA, BILATERAL: ICD-10-CM

## 2024-06-12 DIAGNOSIS — H40.003 GLAUCOMA SUSPECT OF BOTH EYES: Primary | ICD-10-CM

## 2024-06-12 DIAGNOSIS — H35.30 AMD (AGE RELATED MACULAR DEGENERATION): ICD-10-CM

## 2024-06-12 DIAGNOSIS — H52.4 PRESBYOPIA: ICD-10-CM

## 2024-06-12 DIAGNOSIS — H52.223 REGULAR ASTIGMATISM OF BOTH EYES: ICD-10-CM

## 2024-06-12 DIAGNOSIS — H35.371 EPIRETINAL MEMBRANE (ERM) OF RIGHT EYE: ICD-10-CM

## 2024-06-12 DIAGNOSIS — H25.813 COMBINED FORMS OF AGE-RELATED CATARACT OF BOTH EYES: ICD-10-CM

## 2024-06-12 DIAGNOSIS — H47.393 OPTIC NERVE CUPPING OF BOTH EYES: ICD-10-CM

## 2024-06-12 LAB
AVERAGE RNFL TODAY (OD): 82
AVERAGE RNFL TODAY (OS): 84
C/D RATIO TODAY (OD): 0.66
C/D RATIO TODAY (OS): 0.71

## 2024-06-12 PROCEDURE — 92004 COMPRE OPH EXAM NEW PT 1/>: CPT | Performed by: OPHTHALMOLOGY

## 2024-06-12 PROCEDURE — 92134 CPTRZ OPH DX IMG PST SGM RTA: CPT | Performed by: OPHTHALMOLOGY

## 2024-06-12 ASSESSMENT — REFRACTION_WEARINGRX
OS_ADD: +2.25
OD_CYLINDER: -0.50
OD_SPHERE: -1.25
OD_AXIS: 130
OD_ADD: +2.25
OS_AXIS: 130
OS_SPHERE: -0.75
OS_CYLINDER: -1.25

## 2024-06-12 ASSESSMENT — SLIT LAMP EXAM - LIDS
COMMENTS: NORMAL
COMMENTS: NORMAL

## 2024-06-12 ASSESSMENT — PACHYMETRY
OD_CT(UM): 577
OS_CT(UM): 583

## 2024-06-12 ASSESSMENT — TONOMETRY
IOP_METHOD: GOLDMANN APPLANATION
OS_IOP_MMHG: 19
OD_IOP_MMHG: 21

## 2024-06-12 ASSESSMENT — VISUAL ACUITY
METHOD: SNELLEN - LINEAR
OD_CC: 20/20-2
OS_CC: 20/20

## 2024-06-12 ASSESSMENT — EXTERNAL EXAM - RIGHT EYE: OD_EXAM: NORMAL

## 2024-06-12 ASSESSMENT — CUP TO DISC RATIO
OD_RATIO: 0.65
OS_RATIO: 0.7

## 2024-06-12 ASSESSMENT — EXTERNAL EXAM - LEFT EYE: OS_EXAM: NORMAL

## 2024-06-12 NOTE — PROGRESS NOTES
Assessment/Plan   Diagnoses and all orders for this visit:  Glaucoma suspect of both eyes  -     OCT, Optic Nerve - OU - Both Eyes  -increased cup-to-disc ratio (C/D) ratios both eyes  -borderline  intraocular pressure (IOP)'s right eye more than left  Pachy's: 577/588    AMD (age related macular degeneration)  Epiretinal membrane right eye  Macular edema right eye  -refer to Retina Service for evaluation and management    Combined forms of age-related cataract of both eyes  Not visually significant at the present time  continue to monitor    Optic nerve cupping of both eyes  continue to monitor    Myopia, bilateral  Regular astigmatism of both eyes  Presbyopia  Pt declined refraction today    Return in  3  month(s) for follow up or sooner if having any problems  VF  at next visit

## 2024-06-13 LAB — STAPHYLOCOCCUS SPEC CULT: NORMAL

## 2024-06-24 ENCOUNTER — APPOINTMENT (OUTPATIENT)
Dept: OPHTHALMOLOGY | Facility: CLINIC | Age: 64
End: 2024-06-24
Payer: MEDICARE

## 2024-06-24 DIAGNOSIS — H35.373 EPIRETINAL MEMBRANE (ERM) OF BOTH EYES: Primary | ICD-10-CM

## 2024-06-24 PROCEDURE — 92134 CPTRZ OPH DX IMG PST SGM RTA: CPT | Performed by: OPHTHALMOLOGY

## 2024-06-24 PROCEDURE — 99203 OFFICE O/P NEW LOW 30 MIN: CPT | Performed by: OPHTHALMOLOGY

## 2024-06-24 ASSESSMENT — ENCOUNTER SYMPTOMS
CONSTITUTIONAL NEGATIVE: 0
EYES NEGATIVE: 1
MUSCULOSKELETAL NEGATIVE: 0
PSYCHIATRIC NEGATIVE: 0
RESPIRATORY NEGATIVE: 0
ALLERGIC/IMMUNOLOGIC NEGATIVE: 0
NEUROLOGICAL NEGATIVE: 0
CARDIOVASCULAR NEGATIVE: 0
ENDOCRINE NEGATIVE: 0
HEMATOLOGIC/LYMPHATIC NEGATIVE: 0
GASTROINTESTINAL NEGATIVE: 0

## 2024-06-24 ASSESSMENT — SLIT LAMP EXAM - LIDS
COMMENTS: NORMAL
COMMENTS: NORMAL

## 2024-06-24 ASSESSMENT — CONF VISUAL FIELD
OS_SUPERIOR_NASAL_RESTRICTION: 0
OS_INFERIOR_TEMPORAL_RESTRICTION: 0
OS_NORMAL: 1
OD_NORMAL: 1
OS_INFERIOR_NASAL_RESTRICTION: 0
OD_SUPERIOR_TEMPORAL_RESTRICTION: 0
OD_INFERIOR_NASAL_RESTRICTION: 0
OD_SUPERIOR_NASAL_RESTRICTION: 0
OD_INFERIOR_TEMPORAL_RESTRICTION: 0
OS_SUPERIOR_TEMPORAL_RESTRICTION: 0

## 2024-06-24 ASSESSMENT — VISUAL ACUITY
OD_CC+: -2
OS_CC+: -2
CORRECTION_TYPE: GLASSES
OS_CC: 20/20
METHOD: SNELLEN - LINEAR
OD_CC: 20/20

## 2024-06-24 ASSESSMENT — PACHYMETRY
OS_CT(UM): 583
OD_CT(UM): 577

## 2024-06-24 ASSESSMENT — TONOMETRY
IOP_METHOD: GOLDMANN APPLANATION
OD_IOP_MMHG: 18
OS_IOP_MMHG: 18

## 2024-06-24 ASSESSMENT — CUP TO DISC RATIO
OD_RATIO: 0.65
OS_RATIO: 0.7

## 2024-06-24 ASSESSMENT — EXTERNAL EXAM - RIGHT EYE: OD_EXAM: NORMAL

## 2024-06-24 ASSESSMENT — EXTERNAL EXAM - LEFT EYE: OS_EXAM: NORMAL

## 2024-06-24 NOTE — PROGRESS NOTES
Subjective   Patient ID: Tom Morales is a 64 y.o. male.    Chief Complaint    Retina NPV        HPI    64 year old male present for ERM Referred by Dr. Lennon, pt has no hx of any prev eye sx, pt has no family hx of glaucoma, but suspects daughter may have macular degeneration, pt has no hx of any trauma to OU, pt states he experience floaters, pt has trouble with vision at night when driving, pt has light sensitivity with a halo around lights. Pt states that flashes happened a few times pt states that it was a come and go occurrence.   Last edited by Lorie Sarmiento on 6/24/2024  9:05 AM.        No current outpatient medications on file. (Ophthalmology pharm classes)       Current Outpatient Medications (Other)   Medication Sig Dispense Refill    aspirin 81 mg EC tablet Take 1 tablet (81 mg) by mouth once daily.      b complex vitamins capsule Take 1 capsule by mouth once daily.      CAPSICUM, CAYENNE, ORAL Take 1 capsule by mouth once daily.      chlorhexidine (Peridex) 0.12 % solution Swish for 30 seconds and spit 15mL of solution the night before and morning of surgery 475 mL 0    cholecalciferol (Vitamin D3) 5,000 Units tablet Take 1 tablet (5,000 Units) by mouth once daily.      clobetasoL-calcipotriene 0.05-0.005 % solution Apply topically.      cranberry 400 mg capsule Take 3 capsules by mouth once daily.      ginseng 100 mg capsule Take 2 capsules by mouth once daily.      green tea leaf extract 500 mg capsule Take 1 capsule by mouth once daily.      hyalur ac/chond sul/colg II/AA (HYALURONIC ACID, CHOND-COLLGN, ORAL) Take 1 capsule by mouth once daily.      KRILL OIL ORAL Take 1 capsule by mouth once daily.      losartan-hydrochlorothiazide (Hyzaar) 50-12.5 mg tablet Take 1 tablet by mouth once daily.      NON FORMULARY       psyllium (Metamucil) powder Take 1 Dose (5.8 g) by mouth once daily.      saw/vit E/sod ruby/lyc/beta/pyg (PROSTATE HEALTH ORAL) Take 1 capsule by mouth once daily.       tretinoin (Retin-A) 0.025 % cream Apply topically once daily at bedtime.      turmeric/turmeric ext/pepr ext (turmeric-turmeric ext-pepper) 500-3 mg capsule Take by mouth.         Objective   Base Eye Exam       Visual Acuity (Snellen - Linear)         Right Left    Dist cc 20/20 -2 20/20 -2      Correction: Glasses              Tonometry (Goldmann Applanation, 9:10 AM)         Right Left    Pressure 18 18              Pupils         Pupils    Right PERRL, No APD    Left PERRL, No APD              Visual Fields         Left Right     Full Full              Extraocular Movement         Right Left     Full Full              Neuro/Psych       Oriented x3: Yes              Dilation       Both eyes: 1% Mydriacyl & 2.5% Cam  @ 9:10 AM                  Slit Lamp and Fundus Exam       External Exam         Right Left    External Normal Normal              Slit Lamp Exam         Right Left    Lids/Lashes Normal Normal    Conjunctiva/Sclera White and quiet White and quiet    Cornea Clear Clear    Anterior Chamber Deep and quiet Deep and quiet    Iris Round and reactive Round and reactive    Lens 1+ Cortical cataract, 1+ Nuclear sclerosis 1+ Cortical cataract, 1+ Nuclear sclerosis    Anterior Vitreous Normal Normal              Fundus Exam         Right Left    Posterior Vitreous Normal Normal    Disc Normal Normal    C/D Ratio 0.65 0.7    Macula erm, temporal edema Normal    Vessels Normal Normal    Periphery Normal Normal                  Imaging    Macula OCT 06/24/24  Right eye (OD): epiretinal membrane (ERM), distorsion or normal retinal contour with thickening  Left eye (OS): Normal contour and appearance, no IRF/subretinal fluid (SRF)      Assessment/Plan       Referral from Dr. Lennon    #epiretinal membrane (ERM) right eye   -Patient noting some mild metamorphopsia, but good BCVA  -Discussed option of surgery vs monitoring, will monitor for now  -Reassess in 4-6 months, sooner PRN    #Cataracts both eyes  -Managed by  Dr. Lennon, discussed that these are primary cause of glare/halos    Glaucoma suspect of both eyes  -Managed by Dr. Lennon, IOPs 18/18 today    Vitreous floaters both eyes  -Monitor    Myopia, bilateral  Regular astigmatism of both eyes  Presbyopia  Managed by Dr. Lennon  Patient inquired if LASIK would help with epiretinal membrane (ERM) or cataract symptoms, informed that they would not and LASIK would primarily address need for glasses for distance vision.     Follow up 4-6 months me

## 2024-06-25 ENCOUNTER — ANESTHESIA (OUTPATIENT)
Dept: OPERATING ROOM | Facility: HOSPITAL | Age: 64
End: 2024-06-25
Payer: MEDICARE

## 2024-06-25 ENCOUNTER — ANESTHESIA EVENT (OUTPATIENT)
Dept: OPERATING ROOM | Facility: HOSPITAL | Age: 64
End: 2024-06-25
Payer: MEDICARE

## 2024-06-25 ENCOUNTER — HOSPITAL ENCOUNTER (OUTPATIENT)
Facility: HOSPITAL | Age: 64
Setting detail: OUTPATIENT SURGERY
Discharge: HOME | End: 2024-06-25
Attending: SURGERY | Admitting: SURGERY
Payer: MEDICARE

## 2024-06-25 VITALS
TEMPERATURE: 97.7 F | SYSTOLIC BLOOD PRESSURE: 139 MMHG | RESPIRATION RATE: 15 BRPM | DIASTOLIC BLOOD PRESSURE: 75 MMHG | OXYGEN SATURATION: 99 % | HEART RATE: 72 BPM

## 2024-06-25 DIAGNOSIS — K42.9 UMBILICAL HERNIA WITHOUT OBSTRUCTION AND WITHOUT GANGRENE: ICD-10-CM

## 2024-06-25 PROCEDURE — 2500000004 HC RX 250 GENERAL PHARMACY W/ HCPCS (ALT 636 FOR OP/ED): Performed by: SURGERY

## 2024-06-25 PROCEDURE — 0751T DGTZ GLS MCRSCP SLD LEVEL II: CPT | Mod: TC,AHULAB | Performed by: SURGERY

## 2024-06-25 PROCEDURE — 2780000003 HC OR 278 NO HCPCS: Performed by: SURGERY

## 2024-06-25 PROCEDURE — 2500000005 HC RX 250 GENERAL PHARMACY W/O HCPCS: Performed by: SURGERY

## 2024-06-25 PROCEDURE — C1781 MESH (IMPLANTABLE): HCPCS | Performed by: SURGERY

## 2024-06-25 PROCEDURE — 3700000002 HC GENERAL ANESTHESIA TIME - EACH INCREMENTAL 1 MINUTE: Performed by: SURGERY

## 2024-06-25 PROCEDURE — 2720000007 HC OR 272 NO HCPCS: Performed by: SURGERY

## 2024-06-25 PROCEDURE — 7100000001 HC RECOVERY ROOM TIME - INITIAL BASE CHARGE: Performed by: SURGERY

## 2024-06-25 PROCEDURE — 2500000005 HC RX 250 GENERAL PHARMACY W/O HCPCS: Performed by: ANESTHESIOLOGY

## 2024-06-25 PROCEDURE — 3700000001 HC GENERAL ANESTHESIA TIME - INITIAL BASE CHARGE: Performed by: SURGERY

## 2024-06-25 PROCEDURE — 2500000004 HC RX 250 GENERAL PHARMACY W/ HCPCS (ALT 636 FOR OP/ED): Performed by: ANESTHESIOLOGY

## 2024-06-25 PROCEDURE — 7100000010 HC PHASE TWO TIME - EACH INCREMENTAL 1 MINUTE: Performed by: SURGERY

## 2024-06-25 PROCEDURE — 49593 RPR AA HRN 1ST 3-10 RDC: CPT | Performed by: SURGERY

## 2024-06-25 PROCEDURE — 2500000005 HC RX 250 GENERAL PHARMACY W/O HCPCS: Performed by: INTERNAL MEDICINE

## 2024-06-25 PROCEDURE — 7100000002 HC RECOVERY ROOM TIME - EACH INCREMENTAL 1 MINUTE: Performed by: SURGERY

## 2024-06-25 PROCEDURE — 7100000009 HC PHASE TWO TIME - INITIAL BASE CHARGE: Performed by: SURGERY

## 2024-06-25 PROCEDURE — 3600000008 HC OR TIME - EACH INCREMENTAL 1 MINUTE - PROCEDURE LEVEL THREE: Performed by: SURGERY

## 2024-06-25 PROCEDURE — 2500000004 HC RX 250 GENERAL PHARMACY W/ HCPCS (ALT 636 FOR OP/ED): Performed by: INTERNAL MEDICINE

## 2024-06-25 PROCEDURE — 3600000003 HC OR TIME - INITIAL BASE CHARGE - PROCEDURE LEVEL THREE: Performed by: SURGERY

## 2024-06-25 DEVICE — VENTRALEX ST HERNIA PATCH
Type: IMPLANTABLE DEVICE | Site: UMBILICAL | Status: FUNCTIONAL
Brand: VENTRALEX ST HERNIA PATCH

## 2024-06-25 RX ORDER — LIDOCAINE HYDROCHLORIDE 20 MG/ML
INJECTION, SOLUTION INFILTRATION; PERINEURAL AS NEEDED
Status: DISCONTINUED | OUTPATIENT
Start: 2024-06-25 | End: 2024-06-25

## 2024-06-25 RX ORDER — HYDRALAZINE HYDROCHLORIDE 20 MG/ML
20 INJECTION INTRAMUSCULAR; INTRAVENOUS ONCE
Status: COMPLETED | OUTPATIENT
Start: 2024-06-25 | End: 2024-06-25

## 2024-06-25 RX ORDER — KETOROLAC TROMETHAMINE 30 MG/ML
INJECTION, SOLUTION INTRAMUSCULAR; INTRAVENOUS AS NEEDED
Status: DISCONTINUED | OUTPATIENT
Start: 2024-06-25 | End: 2024-06-25

## 2024-06-25 RX ORDER — LIDOCAINE HYDROCHLORIDE 10 MG/ML
0.1 INJECTION, SOLUTION EPIDURAL; INFILTRATION; INTRACAUDAL; PERINEURAL ONCE
Status: DISCONTINUED | OUTPATIENT
Start: 2024-06-25 | End: 2024-06-25 | Stop reason: HOSPADM

## 2024-06-25 RX ORDER — FENTANYL CITRATE 50 UG/ML
INJECTION, SOLUTION INTRAMUSCULAR; INTRAVENOUS AS NEEDED
Status: DISCONTINUED | OUTPATIENT
Start: 2024-06-25 | End: 2024-06-25

## 2024-06-25 RX ORDER — ONDANSETRON HYDROCHLORIDE 2 MG/ML
INJECTION, SOLUTION INTRAVENOUS AS NEEDED
Status: DISCONTINUED | OUTPATIENT
Start: 2024-06-25 | End: 2024-06-25

## 2024-06-25 RX ORDER — ACETAMINOPHEN 325 MG/1
650 TABLET ORAL EVERY 4 HOURS PRN
Status: DISCONTINUED | OUTPATIENT
Start: 2024-06-25 | End: 2024-06-25 | Stop reason: HOSPADM

## 2024-06-25 RX ORDER — SODIUM CHLORIDE 0.9 G/100ML
IRRIGANT IRRIGATION AS NEEDED
Status: DISCONTINUED | OUTPATIENT
Start: 2024-06-25 | End: 2024-06-25 | Stop reason: HOSPADM

## 2024-06-25 RX ORDER — PROPOFOL 10 MG/ML
INJECTION, EMULSION INTRAVENOUS AS NEEDED
Status: DISCONTINUED | OUTPATIENT
Start: 2024-06-25 | End: 2024-06-25

## 2024-06-25 RX ORDER — OXYCODONE HYDROCHLORIDE 5 MG/1
5 TABLET ORAL EVERY 4 HOURS PRN
Status: DISCONTINUED | OUTPATIENT
Start: 2024-06-25 | End: 2024-06-25 | Stop reason: HOSPADM

## 2024-06-25 RX ORDER — MIDAZOLAM HYDROCHLORIDE 1 MG/ML
INJECTION INTRAMUSCULAR; INTRAVENOUS AS NEEDED
Status: DISCONTINUED | OUTPATIENT
Start: 2024-06-25 | End: 2024-06-25

## 2024-06-25 RX ORDER — SODIUM CHLORIDE, SODIUM LACTATE, POTASSIUM CHLORIDE, CALCIUM CHLORIDE 600; 310; 30; 20 MG/100ML; MG/100ML; MG/100ML; MG/100ML
100 INJECTION, SOLUTION INTRAVENOUS CONTINUOUS
Status: DISCONTINUED | OUTPATIENT
Start: 2024-06-25 | End: 2024-06-25 | Stop reason: HOSPADM

## 2024-06-25 RX ORDER — ROCURONIUM BROMIDE 10 MG/ML
INJECTION, SOLUTION INTRAVENOUS AS NEEDED
Status: DISCONTINUED | OUTPATIENT
Start: 2024-06-25 | End: 2024-06-25

## 2024-06-25 RX ORDER — ACETAMINOPHEN 325 MG/1
975 TABLET ORAL ONCE
Status: COMPLETED | OUTPATIENT
Start: 2024-06-25 | End: 2024-06-25

## 2024-06-25 RX ORDER — CEFAZOLIN 1 G/1
INJECTION, POWDER, FOR SOLUTION INTRAVENOUS AS NEEDED
Status: DISCONTINUED | OUTPATIENT
Start: 2024-06-25 | End: 2024-06-25

## 2024-06-25 RX ORDER — OXYCODONE HYDROCHLORIDE 5 MG/1
5 TABLET ORAL EVERY 6 HOURS PRN
Qty: 12 TABLET | Refills: 0 | Status: SHIPPED | OUTPATIENT
Start: 2024-06-25

## 2024-06-25 SDOH — HEALTH STABILITY: MENTAL HEALTH: CURRENT SMOKER: 1

## 2024-06-25 ASSESSMENT — PAIN - FUNCTIONAL ASSESSMENT
PAIN_FUNCTIONAL_ASSESSMENT: 0-10
PAIN_FUNCTIONAL_ASSESSMENT: 0-10
PAIN_FUNCTIONAL_ASSESSMENT: UNABLE TO SELF-REPORT
PAIN_FUNCTIONAL_ASSESSMENT: 0-10
PAIN_FUNCTIONAL_ASSESSMENT: UNABLE TO SELF-REPORT
PAIN_FUNCTIONAL_ASSESSMENT: 0-10
PAIN_FUNCTIONAL_ASSESSMENT: 0-10
PAIN_FUNCTIONAL_ASSESSMENT: UNABLE TO SELF-REPORT

## 2024-06-25 ASSESSMENT — PAIN SCALES - GENERAL
PAINLEVEL_OUTOF10: 6
PAINLEVEL_OUTOF10: 4
PAINLEVEL_OUTOF10: 6

## 2024-06-25 ASSESSMENT — COLUMBIA-SUICIDE SEVERITY RATING SCALE - C-SSRS
2. HAVE YOU ACTUALLY HAD ANY THOUGHTS OF KILLING YOURSELF?: NO
6. HAVE YOU EVER DONE ANYTHING, STARTED TO DO ANYTHING, OR PREPARED TO DO ANYTHING TO END YOUR LIFE?: NO
1. IN THE PAST MONTH, HAVE YOU WISHED YOU WERE DEAD OR WISHED YOU COULD GO TO SLEEP AND NOT WAKE UP?: NO

## 2024-06-25 ASSESSMENT — PAIN DESCRIPTION - LOCATION: LOCATION: ABDOMEN

## 2024-06-25 NOTE — OP NOTE
Open Umbilical Hernia Repair; Mesh Placement Operative Note     Date: 2024  OR Location: [facility] A OR    Name: Tom Morales, : 1960, Age: 64 y.o., MRN: [id number], Sex: male    Diagnosis  Pre-op Diagnosis     * Incisional Hernia  Post-op Diagnosis       * Incisional Hernia      Procedures  Open Umbilical Hernia Repair; Mesh Placement         Surgeons      * Bang Arnold - Primary    Resident/Fellow/Other Assistant:  Surgeons and Role:  * No surgeons found with a matching role *    Procedure Summary  Anesthesia: General  ASA: II  Anesthesia Staff: Anesthesiologist: Casey Ventura MD  CRNA: Rigo Nguyễn, APRN-CNP, APRN-CRNA  Estimated Blood Loss: 3 mL  Intra-op Medications:   Administrations occurring from 1000 to 1130 on 24:   Medication Name Total Dose   sodium chloride 0.9 % irrigation solution 1,000 mL   BUPivacaine HCl (Marcaine) 0.5 % (5 mg/mL) 30 mL, lidocaine (Xylocaine) 30 mL syringe 10 mL              Anesthesia Record               Intraprocedure I/O Totals          Intake    LR bolus 750.00 mL    Total Intake 750 mL          Specimen:   ID Type Source Tests Collected by Time   1 : HERNIA SAC Tissue HERNIA SAC SURGICAL PATHOLOGY EXAM Bang Arnold MD 2024 1059        Staff:   Circulator: Chinyere Dailey Person: Jake Hastings Circulator: Georgi Hastings Scrub: Priyanka        Findings: 4.2 cm hernia defect     Indications: Tom Morales is an 64 y.o. male who is having surgery for Umbilical hernia without obstruction and without gangrene [K42.9].     The patient was seen in the preoperative area. The risks, benefits, complications, treatment options, non-operative alternatives, expected recovery and outcomes were discussed with the patient. The possibilities of reaction to medication, pulmonary aspiration, injury to surrounding structures, bleeding, recurrent infection, the need for additional procedures, failure to diagnose a condition, and creating a complication requiring  transfusion or operation were discussed with the patient. The patient concurred with the proposed plan, giving informed consent.  The site of surgery was properly noted/marked if necessary per policy. The patient has been actively warmed in preoperative area. Preoperative antibiotics have been ordered and given within 1 hours of incision. Venous thrombosis prophylaxis have been ordered including bilateral sequential compression devices     Procedure Details:    The patient is a 64 y.o. year-old  male comes in for elective repair of a moderate-sized saumya umbilical incisional hernia.  The risks, benefits, and alternatives were discussed  preoperative and he agreed to the operation.     DESCRIPTION OF PROCEDURE:    The patient was taken to the operating room and placed supine on the  operating table.  Time-out was established.  General anesthesia was  induced.  he received antibiotics.  We made an infraumbilical  midline incision, carried our dissection down to the abdominal wall  fascia.  We encircled the hernia defect and  the hernia sac  from the skin of the umbilicus.  We excised the sac and handed off  the field as a specimen.  A portion of the preperitoneal fat was  included in this.  The defect measured 4.2 cm, and we elected to repair  this using a 6.4 cm round Ventralex mesh.  This was placed as an  underlay and secured on 4 quadrants using 0-Prolene suture placed  in an interrupted horizontal mattress fashion.  The knots were tied  anterior to the abdominal wall fascia.  This brought the mesh to lie  nicely as an underlay.  We then reapproximated the native fascia over  repair using #1 PDS suture.  We tacked the skin of the umbilicus down to the repair.  Skin was then closed using 3-0 and 4-0  subcuticular Vicryl stitches followed by Dermabond glue.  Patient tolerated the procedure without difficulty and was returned to the recovery room in stable conditions.        Complications:  None; patient  tolerated the procedure well.    Disposition: PACU - hemodynamically stable.  Condition: stable     Attending Attestation: I performed the procedure.    Bang Arnold  Phone Number: [phone]

## 2024-06-25 NOTE — NURSING NOTE
1320 Assuming care of pt at this time. Bedside report received from outgoing RN. Per Arabella RN, dc instructions provided to pt daughter via phone call while pt still in phase 1 status. Pt daughter on way to  pt from hospital.   1325 pt assisted with dressing at this time with help of this RN. IV removed, dressing applied.   1335 dc instructions reiterated to patient, no concerns all questions answered.   1338 Pt assisted to main lobby via  by transport. Dc in stable condition to home. All belongings taken with pt.

## 2024-06-25 NOTE — ANESTHESIA POSTPROCEDURE EVALUATION
Patient: Tom Morales    Procedure Summary       Date: 06/25/24 Room / Location: [facility] OR 05 / [facility] OR    Anesthesia Start: 1012 Anesthesia Stop: 1126    Procedure: Open Umbilical Hernia Repair; Mesh Placement Diagnosis:       Umbilical hernia without obstruction and without gangrene      (Umbilical hernia without obstruction and without gangrene [K42.9])    Surgeons: Bang Arnold MD Responsible Provider: Casey Ventura MD    Anesthesia Type: general ASA Status: 2            Anesthesia Type: general    Vitals Value Taken Time   BP 96/55 06/25/24 1128   Temp 35.6 06/25/24 1128   Pulse 62 06/25/24 1128   Resp 11 06/25/24 1128   SpO2 99 06/25/24 1128       Anesthesia Post Evaluation    Patient location during evaluation: PACU  Patient participation: complete - patient participated  Level of consciousness: awake  Pain management: adequate  Airway patency: patent  Cardiovascular status: acceptable  Respiratory status: acceptable  Hydration status: acceptable  Postoperative Nausea and Vomiting: none        No notable events documented.

## 2024-06-25 NOTE — ANESTHESIA PREPROCEDURE EVALUATION
Patient: Tom Morales    Procedure Information       Date/Time: 06/25/24 1000    Procedure: Open Umbilical Hernia Repair; Mesh Placement    Location: [facility] A OR 05 / Virtual [facility] A OR    Surgeons: Bang Arnold MD            Relevant Problems   No relevant active problems       Clinical information reviewed:   Tobacco  Allergies  Meds   Med Hx  Surg Hx   Fam Hx  Soc Hx         Past Medical History:   Diagnosis Date    Hypertension     Snores     No Cpap or Bipap    Umbilical hernia       Past Surgical History:   Procedure Laterality Date    HEMORRHOID SURGERY      HERNIA REPAIR Right     2023, large inguinal    JOINT REPLACEMENT      Right knee    KNEE ARTHROSCOPY W/ ACL RECONSTRUCTION Right 2000    KNEE SURGERY Right     scpoe x2    NECK SURGERY  06/06/2018    Neck Surgery, removal of 5th cervical vertebra in 2018    NOSE SURGERY  06/06/2018    Nose Surgery, 2021, repair of deformity of skin of nose    TONSILECTOMY, ADENOIDECTOMY, BILATERAL MYRINGOTOMY AND TUBES      as a child     Social History     Tobacco Use    Smoking status: Every Day     Types: Cigarettes, Cigars    Smokeless tobacco: Never    Tobacco comments:     Black and mild, smokes 4 daily   Vaping Use    Vaping status: Never Used   Substance Use Topics    Alcohol use: Yes     Alcohol/week: 42.0 standard drinks of alcohol     Types: 42 Cans of beer per week    Drug use: Yes     Types: Marijuana     Comment: smokes daily      Current Outpatient Medications   Medication Instructions    aspirin 81 mg, oral, Daily    b complex vitamins capsule 1 capsule, oral, Daily    CAPSICUM, CAYENNE, ORAL 1 capsule, oral, Daily    chlorhexidine (Peridex) 0.12 % solution Swish for 30 seconds and spit 15mL of solution the night before and morning of surgery    cholecalciferol (VITAMIN D3) 5,000 Units, oral, Daily    clobetasoL-calcipotriene 0.05-0.005 % solution topical (top)    cranberry 400 mg capsule 3 capsules, oral, Daily    ginseng 100 mg capsule 2 capsules,  "oral, Daily    green tea leaf extract 500 mg capsule 1 capsule, oral, Daily    hyalur ac/chond sul/colg II/AA (HYALURONIC ACID, CHOND-COLLGN, ORAL) 1 capsule, oral, Daily    KRILL OIL ORAL 1 capsule, oral, Daily    losartan-hydrochlorothiazide (Hyzaar) 50-12.5 mg tablet 1 tablet, oral, Daily    NON FORMULARY No dose, route, or frequency recorded.    psyllium (Metamucil) powder 1 Dose, oral, Daily    saw/vit E/sod ruby/lyc/beta/pyg (PROSTATE HEALTH ORAL) 1 capsule, oral, Daily    tretinoin (Retin-A) 0.025 % cream Topical, Nightly    turmeric/turmeric ext/pepr ext (turmeric-turmeric ext-pepper) 500-3 mg capsule oral      Allergies   Allergen Reactions    Ace Inhibitors Anaphylaxis    Amoxicillin Unknown     Patient thinks reaction happened at same time as Ace inhibitors        Chemistry    Lab Results   Component Value Date/Time     06/11/2024 1055    K 3.9 06/11/2024 1055     06/11/2024 1055    CO2 24 06/11/2024 1055    BUN 6 06/11/2024 1055    CREATININE 0.84 06/11/2024 1055    Lab Results   Component Value Date/Time    CALCIUM 10.0 06/11/2024 1055    ALKPHOS 60 06/11/2024 1055    AST 21 06/11/2024 1055    ALT 14 06/11/2024 1055    BILITOT 0.6 06/11/2024 1055          No results found for: \"HGBA1C\"  Lab Results   Component Value Date/Time    WBC 5.7 06/11/2024 1055    HGB 17.0 06/11/2024 1055    HCT 48.6 06/11/2024 1055     06/11/2024 1055     Lab Results   Component Value Date/Time    PROTIME 11.4 12/22/2017 0927    INR 1.0 12/22/2017 0927     No results found for: \"ABORH\"  No results found for this or any previous visit (from the past 4464 hour(s)).  No results found for this or any previous visit from the past 1095 days.       Visit Vitals  Smoking Status Every Day     NPO/Void Status  Carbohydrate Drink Given Prior to Surgery? : N  Date of Last Liquid: 06/25/24  Time of Last Liquid: 0530  Date of Last Solid: 06/24/24  Time of Last Solid: 2300  Last Intake Type: Clear fluids  Time of Last Void: " 0700         Physical Exam    Airway  Mallampati: III  TM distance: >3 FB  Neck ROM: full     Cardiovascular - normal exam     Dental - normal exam     Pulmonary - normal exam     Abdominal - normal exam              Anesthesia Plan    History of general anesthesia?: yes  History of complications of general anesthesia?: no    ASA 2     general     The patient is a current smoker.    intravenous induction   Postoperative administration of opioids is intended.  Anesthetic plan and risks discussed with patient.  Use of blood products discussed with patient who.    Plan discussed with CRNA.

## 2024-06-25 NOTE — ANESTHESIA PROCEDURE NOTES
Airway  Date/Time: 6/25/2024 10:22 AM  Urgency: elective    Airway not difficult    Staffing  Performed: CRNA   Authorized by: Casey Ventura MD    Performed by: Rigo Nguyễn, ALAN-CNP, APRN-CRNA  Patient location during procedure: OR    Indications and Patient Condition  Indications for airway management: anesthesia  Spontaneous ventilation: present  Sedation level: deep  Preoxygenated: yes  Patient position: sniffing  MILS maintained throughout  Mask difficulty assessment: 1 - vent by mask  No planned trial extubation    Final Airway Details  Final airway type: endotracheal airway      Successful airway: ETT  Cuffed: yes   Successful intubation technique: direct laryngoscopy  Blade: Harp  Blade size: #2  ETT size (mm): 7.5  Cormack-Lehane Classification: grade III - view of epiglottis only  Placement verified by: capnometry   Measured from: teeth  ETT to teeth (cm): 24  Number of attempts at approach: 1

## 2024-07-02 LAB
LABORATORY COMMENT REPORT: NORMAL
PATH REPORT.FINAL DX SPEC: NORMAL
PATH REPORT.GROSS SPEC: NORMAL
PATH REPORT.MICROSCOPIC SPEC OTHER STN: NORMAL
PATH REPORT.RELEVANT HX SPEC: NORMAL
PATH REPORT.TOTAL CANCER: NORMAL

## 2024-07-10 ENCOUNTER — APPOINTMENT (OUTPATIENT)
Dept: SURGERY | Facility: CLINIC | Age: 64
End: 2024-07-10
Payer: COMMERCIAL

## 2024-07-16 LAB
ATRIAL RATE: 71 BPM
P AXIS: 46 DEGREES
P OFFSET: 188 MS
P ONSET: 132 MS
PR INTERVAL: 182 MS
Q ONSET: 223 MS
QRS COUNT: 12 BEATS
QRS DURATION: 86 MS
QT INTERVAL: 370 MS
QTC CALCULATION(BAZETT): 402 MS
QTC FREDERICIA: 391 MS
R AXIS: -53 DEGREES
T AXIS: 46 DEGREES
T OFFSET: 408 MS
VENTRICULAR RATE: 71 BPM

## 2024-07-18 ENCOUNTER — OFFICE VISIT (OUTPATIENT)
Dept: SURGERY | Facility: CLINIC | Age: 64
End: 2024-07-18
Payer: MEDICARE

## 2024-07-18 VITALS
WEIGHT: 180 LBS | SYSTOLIC BLOOD PRESSURE: 148 MMHG | HEIGHT: 71 IN | TEMPERATURE: 97.8 F | DIASTOLIC BLOOD PRESSURE: 83 MMHG | HEART RATE: 112 BPM | BODY MASS INDEX: 25.2 KG/M2

## 2024-07-18 DIAGNOSIS — Z09 POSTOPERATIVE EXAMINATION: Primary | ICD-10-CM

## 2024-07-18 PROCEDURE — 99211 OFF/OP EST MAY X REQ PHY/QHP: CPT | Performed by: SURGERY

## 2024-07-18 PROCEDURE — 3008F BODY MASS INDEX DOCD: CPT | Performed by: SURGERY

## 2024-07-18 RX ORDER — DOXYCYCLINE 50 MG/1
50 TABLET ORAL 2 TIMES DAILY
Qty: 20 TABLET | Refills: 0 | Status: SHIPPED | OUTPATIENT
Start: 2024-07-18 | End: 2024-07-28

## 2024-07-18 RX ORDER — AMOXICILLIN AND CLAVULANATE POTASSIUM 875; 125 MG/1; MG/1
1 TABLET, FILM COATED ORAL 2 TIMES DAILY
Qty: 28 TABLET | Refills: 0 | Status: CANCELLED | OUTPATIENT
Start: 2024-07-18 | End: 2024-08-01

## 2024-07-18 ASSESSMENT — PAIN SCALES - GENERAL: PAINLEVEL: 0-NO PAIN

## 2024-07-18 NOTE — PROGRESS NOTES
Subjective   Patient ID: Tom Morales is a 64 y.o. male who presents for Post-op.       Mr. Tom Morales is a 64 y.o. year old male who comes in today for follow-up after undergoing umbilical hernia repair with mesh.    This procedure was performed on  6/25/24      Patient is doing very well but he has had some seepage from his incision and was seen in the ER where he was noted to have a seroma.    Seepage slowing down at this time     Tolerating a regular diet, bowel movements are normal    On exam patient looks well    Incisions are healing very nicely there are 2 small openings in the upper aspect.    We elected to clean up the wound, aspirate small amount of seroma fluid.  We oversewed the area of dehiscence with 4-0 Vicryl in a running fashion.  He tolerated this well.    Impression: Doing well following umbilical hernia repair with mesh.  Has slight wound dehiscence with some seepage of fluid.  Otherwise the wound looks okay I have recommended antibiotics, an absorptive pad placed twice daily or as needed    Discussed increasing activity level    Follow-up with me in 10 days  Bang Arnold MD 07/18/24 9:57 AM

## 2024-07-29 ENCOUNTER — OFFICE VISIT (OUTPATIENT)
Dept: SURGERY | Facility: CLINIC | Age: 64
End: 2024-07-29
Payer: MEDICARE

## 2024-07-29 VITALS
HEART RATE: 87 BPM | SYSTOLIC BLOOD PRESSURE: 137 MMHG | DIASTOLIC BLOOD PRESSURE: 79 MMHG | WEIGHT: 180 LBS | BODY MASS INDEX: 25.46 KG/M2

## 2024-07-29 DIAGNOSIS — Z09 POSTOPERATIVE EXAMINATION: Primary | ICD-10-CM

## 2024-07-29 PROCEDURE — 99211 OFF/OP EST MAY X REQ PHY/QHP: CPT | Performed by: SURGERY

## 2024-07-29 ASSESSMENT — ENCOUNTER SYMPTOMS: DEPRESSION: 0

## 2024-07-29 ASSESSMENT — PAIN SCALES - GENERAL: PAINLEVEL: 2

## 2024-07-29 NOTE — PROGRESS NOTES
Subjective   Patient ID: Tom Morales is a 64 y.o. male who presents for Post-op.  Patient postop umbilical hernia repair with mesh    Has had some seepage from his wound and the wound was oversewn using 4-0 Vicryl roughly 10 days ago.  He was prescribed antibiotics however there is a problem with the pharmacist not having the medication so he never took the antibiotic    He is doing well    In general the wound is healing nicely.  Removed partially absorbs suture material and some glue.    Instructed to apply an absorptive pad as needed    Follow-up with me in 3 weeks for wound check         Bang Arnold MD 07/29/24 9:50 AM

## 2024-08-07 ENCOUNTER — APPOINTMENT (OUTPATIENT)
Dept: DERMATOLOGY | Facility: CLINIC | Age: 64
End: 2024-08-07
Payer: COMMERCIAL

## 2024-08-19 ENCOUNTER — OFFICE VISIT (OUTPATIENT)
Dept: SURGERY | Facility: CLINIC | Age: 64
End: 2024-08-19
Payer: MEDICARE

## 2024-08-19 VITALS
TEMPERATURE: 98 F | BODY MASS INDEX: 25.46 KG/M2 | DIASTOLIC BLOOD PRESSURE: 87 MMHG | WEIGHT: 180 LBS | SYSTOLIC BLOOD PRESSURE: 137 MMHG | HEART RATE: 98 BPM

## 2024-08-19 DIAGNOSIS — Z09 FOLLOW-UP EXAM: Primary | ICD-10-CM

## 2024-08-19 PROCEDURE — 99211 OFF/OP EST MAY X REQ PHY/QHP: CPT | Performed by: SURGERY

## 2024-08-19 ASSESSMENT — ENCOUNTER SYMPTOMS: DEPRESSION: 0

## 2024-08-19 ASSESSMENT — PAIN SCALES - GENERAL: PAINLEVEL: 0-NO PAIN

## 2024-08-19 NOTE — PROGRESS NOTES
Tom comes in today for follow-up.  Status post umbilical hernia repair with mesh.    Has had difficulty with wound healing.    On exam the wound is healing by second intention.  There are few open areas in the midline.    He is in been applying some hydrogen peroxide and dry sterile dressing as needed    Follow-up with me in 3 weeks

## 2024-09-11 ENCOUNTER — APPOINTMENT (OUTPATIENT)
Dept: OPHTHALMOLOGY | Facility: CLINIC | Age: 64
End: 2024-09-11
Payer: COMMERCIAL

## 2024-09-11 DIAGNOSIS — H40.003 GLAUCOMA SUSPECT OF BOTH EYES: Primary | ICD-10-CM

## 2024-09-11 DIAGNOSIS — H35.373 EPIRETINAL MEMBRANE (ERM) OF BOTH EYES: ICD-10-CM

## 2024-09-11 DIAGNOSIS — H25.813 COMBINED FORMS OF AGE-RELATED CATARACT OF BOTH EYES: ICD-10-CM

## 2024-09-11 PROCEDURE — 92012 INTRM OPH EXAM EST PATIENT: CPT | Performed by: OPHTHALMOLOGY

## 2024-09-11 PROCEDURE — 92083 EXTENDED VISUAL FIELD XM: CPT | Performed by: OPHTHALMOLOGY

## 2024-09-11 ASSESSMENT — VISUAL ACUITY
OD_CC: 20/20-1
METHOD: SNELLEN - LINEAR
OS_CC: 20/20-1

## 2024-09-11 ASSESSMENT — TONOMETRY
OS_IOP_MMHG: 21
IOP_METHOD: GOLDMANN APPLANATION
OD_IOP_MMHG: 19

## 2024-09-11 ASSESSMENT — ENCOUNTER SYMPTOMS: EYES NEGATIVE: 1

## 2024-09-11 ASSESSMENT — PACHYMETRY
OD_CT(UM): 577
OS_CT(UM): 583

## 2024-09-11 ASSESSMENT — SLIT LAMP EXAM - LIDS
COMMENTS: NORMAL
COMMENTS: NORMAL

## 2024-09-11 ASSESSMENT — EXTERNAL EXAM - RIGHT EYE: OD_EXAM: NORMAL

## 2024-09-11 ASSESSMENT — EXTERNAL EXAM - LEFT EYE: OS_EXAM: NORMAL

## 2024-09-11 NOTE — PROGRESS NOTES
Assessment/Plan   Diagnoses and all orders for this visit:  Glaucoma suspect of both eyes  -     Joshua Visual Field - OU - Both Eyes  Visual field (VF) wnl both eyes  continue to monitor    Combined forms of age-related cataract of both eyes  Not visually significant at the present time  continue to monitor    Epiretinal membrane (ERM) of both eyes  Managed by Dr. Holguin  -keep appointment with Dr. Holguin in October    Return in  6  month(s) for follow up or sooner if having any problems

## 2024-09-16 ENCOUNTER — OFFICE VISIT (OUTPATIENT)
Dept: SURGERY | Facility: CLINIC | Age: 64
End: 2024-09-16
Payer: MEDICARE

## 2024-09-16 VITALS
BODY MASS INDEX: 25.04 KG/M2 | WEIGHT: 177 LBS | HEART RATE: 97 BPM | OXYGEN SATURATION: 100 % | SYSTOLIC BLOOD PRESSURE: 112 MMHG | TEMPERATURE: 95.4 F | DIASTOLIC BLOOD PRESSURE: 72 MMHG

## 2024-09-16 DIAGNOSIS — Z09 FOLLOW-UP EXAM: Primary | ICD-10-CM

## 2024-09-16 ASSESSMENT — PAIN SCALES - GENERAL: PAINLEVEL: 0-NO PAIN

## 2024-09-16 NOTE — PROGRESS NOTES
For an umbilical hernia repair.    He has had some difficulty wound healing.  He comes in for wound check    On exam his wound is healed over very nicely    Follow-up with me as needed

## 2024-09-19 ENCOUNTER — APPOINTMENT (OUTPATIENT)
Dept: SURGERY | Facility: CLINIC | Age: 64
End: 2024-09-19
Payer: COMMERCIAL

## 2024-10-28 ENCOUNTER — APPOINTMENT (OUTPATIENT)
Dept: OPHTHALMOLOGY | Facility: CLINIC | Age: 64
End: 2024-10-28
Payer: MEDICARE

## 2024-10-28 DIAGNOSIS — H35.371 EPIRETINAL MEMBRANE (ERM) OF RIGHT EYE: Primary | ICD-10-CM

## 2024-10-28 DIAGNOSIS — H04.123 DRY EYES: ICD-10-CM

## 2024-10-28 PROCEDURE — 92134 CPTRZ OPH DX IMG PST SGM RTA: CPT | Performed by: OPHTHALMOLOGY

## 2024-10-28 PROCEDURE — 99213 OFFICE O/P EST LOW 20 MIN: CPT | Performed by: OPHTHALMOLOGY

## 2024-10-28 ASSESSMENT — PACHYMETRY
OS_CT(UM): 583
OD_CT(UM): 577

## 2024-10-28 ASSESSMENT — VISUAL ACUITY
METHOD: SNELLEN - LINEAR
CORRECTION_TYPE: GLASSES
OS_CC+: -1
OS_CC: 20/20
OD_CC: 20/25
OD_CC+: +1

## 2024-10-28 ASSESSMENT — SLIT LAMP EXAM - LIDS
COMMENTS: NORMAL
COMMENTS: NORMAL

## 2024-10-28 ASSESSMENT — TONOMETRY
IOP_METHOD: TONOPEN
OS_IOP_MMHG: 21
OD_IOP_MMHG: 20

## 2024-10-28 ASSESSMENT — ENCOUNTER SYMPTOMS
PSYCHIATRIC NEGATIVE: 0
HEMATOLOGIC/LYMPHATIC NEGATIVE: 0
MUSCULOSKELETAL NEGATIVE: 0
RESPIRATORY NEGATIVE: 0
GASTROINTESTINAL NEGATIVE: 0
CARDIOVASCULAR NEGATIVE: 0
CONSTITUTIONAL NEGATIVE: 0
EYES NEGATIVE: 1
ALLERGIC/IMMUNOLOGIC NEGATIVE: 0
NEUROLOGICAL NEGATIVE: 0
ENDOCRINE NEGATIVE: 0

## 2024-10-28 ASSESSMENT — CUP TO DISC RATIO
OD_RATIO: 0.65
OS_RATIO: 0.7

## 2024-10-28 ASSESSMENT — EXTERNAL EXAM - LEFT EYE: OS_EXAM: NORMAL

## 2024-10-28 ASSESSMENT — EXTERNAL EXAM - RIGHT EYE: OD_EXAM: NORMAL

## 2025-03-12 ENCOUNTER — APPOINTMENT (OUTPATIENT)
Dept: OPHTHALMOLOGY | Facility: CLINIC | Age: 65
End: 2025-03-12
Payer: COMMERCIAL

## 2025-04-28 ENCOUNTER — APPOINTMENT (OUTPATIENT)
Dept: OPHTHALMOLOGY | Facility: CLINIC | Age: 65
End: 2025-04-28
Payer: MEDICARE

## (undated) DEVICE — BINDER, ABDOMINAL, 4 PANEL, 12 X 46-62 IN

## (undated) DEVICE — DRAPE, SHEET, ENDOSCOPY, GENERAL, FENESTRATED, ARMBOARD COVER, 98 X 123.5 IN, DISPOSABLE, LF, STERILE

## (undated) DEVICE — GLOVE, SURGICAL, PROTEXIS PI BLUE W/NEUTHERA, 8.0, PF, LF

## (undated) DEVICE — TIP,  ELECTRODE COATED INSULATED, EXTENDED, LF

## (undated) DEVICE — SUTURE, VICRYL PLUS 3-0, SH, 27IN

## (undated) DEVICE — ADHESIVE, SKIN, LIQUIBAND EXCEED

## (undated) DEVICE — APPLICATOR, CHLORAPREP, W/ORANGE TINT, 26ML

## (undated) DEVICE — SUTURE, MONOCRYL, 4-0, 27 IN, PS-2, UNDYED

## (undated) DEVICE — SUTURE, PROLENE, 0, 30 IN, CT-2, BLUE

## (undated) DEVICE — PAD, GROUNDING, ELECTROSURGICAL, W/9 FT CABLE, POLYHESIVE II, ADULT, LF

## (undated) DEVICE — SUTURE, PDS II, 1, 36 IN, CT-1, VIOLET

## (undated) DEVICE — Device

## (undated) DEVICE — SUTURE, VICRYL, 3-0, 27 IN, SH

## (undated) DEVICE — SOLUTION, IRRIGATION, SODIUM CHLORIDE 0.9%, 1000 ML, POUR BOTTLE